# Patient Record
Sex: FEMALE | Race: WHITE | NOT HISPANIC OR LATINO | Employment: OTHER
[De-identification: names, ages, dates, MRNs, and addresses within clinical notes are randomized per-mention and may not be internally consistent; named-entity substitution may affect disease eponyms.]

---

## 2020-06-12 ENCOUNTER — RECORDS - HEALTHEAST (OUTPATIENT)
Dept: ADMINISTRATIVE | Facility: OTHER | Age: 85
End: 2020-06-12

## 2020-06-12 ENCOUNTER — OFFICE VISIT (OUTPATIENT)
Dept: NEUROLOGY | Facility: CLINIC | Age: 85
End: 2020-06-12
Payer: MEDICARE

## 2020-06-12 VITALS — BODY MASS INDEX: 22.15 KG/M2 | HEIGHT: 63 IN | WEIGHT: 125 LBS

## 2020-06-12 DIAGNOSIS — I63.9 ACUTE ISCHEMIC STROKE (H): Primary | ICD-10-CM

## 2020-06-12 DIAGNOSIS — R41.3 MEMORY LOSS: ICD-10-CM

## 2020-06-12 PROBLEM — E11.59 HYPERTENSION ASSOCIATED WITH DIABETES (H): Status: ACTIVE | Noted: 2018-03-23

## 2020-06-12 PROBLEM — E78.5 HYPERLIPIDEMIA DUE TO TYPE 2 DIABETES MELLITUS (H): Status: ACTIVE | Noted: 2017-08-18

## 2020-06-12 PROBLEM — I15.2 HYPERTENSION ASSOCIATED WITH DIABETES (H): Status: ACTIVE | Noted: 2018-03-23

## 2020-06-12 PROBLEM — E11.69 HYPERLIPIDEMIA DUE TO TYPE 2 DIABETES MELLITUS (H): Status: ACTIVE | Noted: 2017-08-18

## 2020-06-12 PROCEDURE — 99205 OFFICE O/P NEW HI 60 MIN: CPT | Mod: 95 | Performed by: PSYCHIATRY & NEUROLOGY

## 2020-06-12 RX ORDER — QUETIAPINE FUMARATE 25 MG/1
25 TABLET, FILM COATED ORAL DAILY
COMMUNITY

## 2020-06-12 RX ORDER — ASPIRIN 81 MG/1
81 TABLET ORAL DAILY
COMMUNITY
End: 2021-04-09

## 2020-06-12 RX ORDER — ACETAMINOPHEN 500 MG
500-1000 TABLET ORAL EVERY 6 HOURS PRN
COMMUNITY

## 2020-06-12 RX ORDER — CLOPIDOGREL BISULFATE 75 MG/1
75 TABLET ORAL DAILY
COMMUNITY
Start: 2018-12-20

## 2020-06-12 RX ORDER — ROSUVASTATIN CALCIUM 20 MG/1
20 TABLET, COATED ORAL DAILY
COMMUNITY

## 2020-06-12 RX ORDER — CARVEDILOL 3.12 MG/1
3.12 TABLET ORAL 2 TIMES DAILY
COMMUNITY
Start: 2019-02-01

## 2020-06-12 ASSESSMENT — MIFFLIN-ST. JEOR: SCORE: 991.13

## 2020-06-12 NOTE — PATIENT INSTRUCTIONS
"  Patient Education   Coping with Memory Loss  What happens when you have memory loss?  Memory loss causes problems with thinking, learning and memory. You may feel forgetful or have trouble focusing on tasks.  Memory loss may be a side effect of medicine, chemotherapy or radiation. In these cases, problems with memory may improve after you finish your treatment. But you could have long-term problems.  Other factors that affect memory and your ability to focus include:    Aging    Illness    Depression    Hormonal changes    Anemia (low red blood cells)    Stress.  What can I do to treat or prevent memory loss?  Problems with thinking and memory can be very frustrating. There is no standard treatment at this time. But there are things you can do to reduce the effects of memory loss:    Really pay attention. Use your eyes, ears and sense of touch to remember things. Focus when someone tells you something.    Limit distractions when you need to complete tasks that require you to focus.    Tell yourself what you are doing as you do it. For example, if you're going out for a few hours, as you close the door tell yourself, \"I'm locking the door now and putting the key in my pocket so I don't have to worry about whether I locked the door.\"    Give yourself clear reminders. If you need to buy dog food, put the empty bag at the door so you'll see it as you leave the house. Or write yourself a note and put it where it's needed.    Keep things in the same place. This way you don't have to wonder where you put your keys, remote control or medicine.    Keep a journal of daily events and activities. Carry a notebook and write down important information that you want to remember.    Exercise your brain. For example, do crossword puzzles, painting, sudoku.    Use word play and rhyming to help remember things.    Use helpful tools, such as:  ? A daily planner  ? A wall calendar  ? Checklists  ? Sticky notes  ? A  near the " door  ? A pre-programmed phone  ? A wristwatch with an alarm  ? A pill box to keep track of your medicines.    Get plenty of sleep and exercise each day.    Stay positive, and try to manage stress.    If you think you may be depressed, talk to your doctor. Depression should be treated.  When should I call my care team?  Call your care team if:    You feel depressed.    You cannot complete basic daily activities.  Comments:  __________________________________________  __________________________________________  __________________________________________  __________________________________________  __________________________________________  __________________________________________  __________________________________________  For informational purposes only. Not to replace the advice of your health care provider. Copyright   2006 [employer]. All rights reserved. Clinically reviewed by [facility]. SMARTworks 936819 - REV 04/19.

## 2020-06-12 NOTE — PROGRESS NOTES
"Patient requested video consultation  Video consultation due to the COVID-19 pandemic  Phone #[phone]  Patient identified      .Patient is being evaluated via a billable video visit. The patient has been notified of following:     \"This video visit will be conducted via a call between you and your physician/provider. We have found that certain health care needs can be provided without the need for an in-person physical exam. This service lets us provide the care you need with a video conversation. If a prescription is necessary we can send it directly to your pharmacy. If lab work is needed we can place an order for that and you can then stop by our lab to have the test done at a later time.    If during the course of the call the physician/provider feels a video visit is not appropriate, you will not be charged for this service.    Physician has received verbal consent for a Video Visit from the patient? YES    Patient would like the video invitation sent by: Text [phone]    [employer]    Video Visit Details    Type of service: Video Visit    Video Start Time: 10:30 am    Video End Time (time video stopped):  11:30 am    Originating Location (pt. Location): Patient's Home    Distant Location (provider location): [facility] [place]     Mode of Communication: Video Conference via [employer]  /  [employer]        Chief complaint  Short-term memory loss  Stroke September 2019 with right gaze preference          HPI    84-year-old who back in September 2019 had a acute stroke.  Patient presented with a right gaze preference    Patient has had difficulty with mass short-term mostly  Son states that patient had difficulty after her eye that was back in 2011    Patient has a sister who developed maybe Alzheimer's disease in her 70s    Patient then had a \"stroke or event in summer 2019 and seemed to have further memory loss    Patient currently lives with HER-2 sons    Current situation  Lives " with treatment  There are stairs there to level she does those okay  Eats okay  Actives of daily living does her own dressing and bathing  Other prescription and others do the checkbook  She denies any hallucinations  She has some insomnia and is on some Seroquel for that      No recent falls no injuries    No recent hospitalizations or major illnesses          Past medical history  Stroke 2019  Coronary artery disease  Hypertension hyperlipidemia  Congestive heart failure  Diabetes  Kidney disease  Cholecystectomy  Anemia  Irritable bowel syndrome        Habits  Non-smoker  Does not drink alcohol      Family history  Sister that may have had Alzheimer's developed in her 70s she is passed away  Mother with lung cancer  Brother with kidney disease  at age 78  Brother with cancer  Brother with heart disease  in his 50s          Work-up  2019, EEG no epileptiform activity  2019 MRI scan no obvious acute stroke or abnormal enhancement motion degraded              Exam    Review of systems pertinent positives and negatives  No headache no chest pain no shortness of breath no nausea vomiting no diarrhea no fever chills no sore throat no cough no rashes    No clear ulcer seizures    No diplopia dysarthria dysphasia  No weakness  Does have arthritis in her right hand has some slow gait    Otherwise review of systems negative    General exam  Alert and attentive  HEENT normal  Breathing okay regular  No edema in the feet  Belly left    Arthritic changes in the hands right greater than left  Decreased range of motion of the shoulders    Neurologic exam  Alert and attentive  Normal naming  Normal repetition  Normal prosody of speech  Follows a two-part command  No neglect    Short-term memory recall decreased  Year is   Does not know the president  Had trouble coming up with the month he got   Got the day wrong      Cranials 2 through 12 normal  No ophthalmoplegia  No visual field  cut  Tongue twisters are good  No nystagmus  Face is    Upper extremities  No drift  No tremor  Finger-nose okay  Because of the shoulders and some arthritic changes in her hands    Lower extremities  Slight bradykinesia  No focal weakness    Gait  Gets up from the rocking chair pushing off with her hands    Can ambulate independently but is somewhat cautious    Turns in the [place] without getting dizzy      Impression    1.  History of memory falloff going on since 2011 after her cardiac illness.  Seen after a event/stroke in September 2019  Family history of sister with dementia all type    2.  Vascular his include hypertension hyperlipidemia diabetes cardiac disease kidney disease    Globin between the high 200s for the highs but never very low        Following discussion with patient and son about her disease next  Talked about routine is helpful    Started on syrup sleep to help modulate her sleep in the last month or 2    Seems to happy with recall and short-term memory but no hallucinations or severe behavioral problems    Recommend    Check EEG pending    MRI scan brain mild atrophy    B12 727  TSH 1.70      Follow-up after the above    Hold off on starting Aricept at this time    May have more of a vascular dementia but does have a sister with demented disease    Outside records and charts reviewed, minutes total care time today with greater than 50% face-to-face discussion about dementia costs and the different factors that can be involved.

## 2020-06-12 NOTE — NURSING NOTE
Chief Complaint   Patient presents with     Consult For     Memory loss-especially short term. Son Freedom who is also POA states that patient had a stroke and was seen in [place] around 9/24/2019 and has declined further since then (records pulled in care everywhere to view)      Video [phone]  doximyoandy Ngo on 6/12/2020 at 10:22 AM

## 2020-06-12 NOTE — LETTER
"    6/12/2020         RE: Riddhi Liu  [address] [address]        Dear Colleague,    Thank you for referring your patient, Riddhi Liu, to the [facility] [place]. Please see a copy of my visit note below.    Patient requested video consultation  Video consultation due to the COVID-19 pandemic  Phone #[phone]  Patient identified      .Patient is being evaluated via a billable video visit. The patient has been notified of following:     \"This video visit will be conducted via a call between you and your physician/provider. We have found that certain health care needs can be provided without the need for an in-person physical exam. This service lets us provide the care you need with a video conversation. If a prescription is necessary we can send it directly to your pharmacy. If lab work is needed we can place an order for that and you can then stop by our lab to have the test done at a later time.    If during the course of the call the physician/provider feels a video visit is not appropriate, you will not be charged for this service.    Physician has received verbal consent for a Video Visit from the patient? YES    Patient would like the video invitation sent by: Text [phone]    [employer]istic    Video Visit Details    Type of service: Video Visit    Video Start Time: 10:30 am    Video End Time (time video stopped):  11:30 am    Originating Location (pt. Location): Patient's Home    Distant Location (provider location): [facility] [place]     Mode of Communication: Video Conference via [employer]  /  Footbalistic        Chief complaint  Short-term memory loss  Stroke September 2019 with right gaze preference          HPI    84-year-old who back in September 2019 had a acute stroke.  Patient presented with a right gaze preference    Patient has had difficulty with mass short-term mostly  Son states that patient had difficulty after her eye that was back in " "    Patient has a sister who developed maybe Alzheimer's disease in her 70s    Patient then had a \"stroke or event in summer 2019 and seemed to have further memory loss    Patient currently lives with HER-2 sons    Current situation  Lives with treatment  There are stairs there to level she does those okay  Eats okay  Actives of daily living does her own dressing and bathing  Other prescription and others do the checkbook  She denies any hallucinations  She has some insomnia and is on some Seroquel for that      No recent falls no injuries    No recent hospitalizations or major illnesses          Past medical history  Stroke 2019  Coronary artery disease  Hypertension hyperlipidemia  Congestive heart failure  Diabetes  Kidney disease  Cholecystectomy  Anemia  Irritable bowel syndrome        Habits  Non-smoker  Does not drink alcohol      Family history  Sister that may have had Alzheimer's developed in her 70s she is passed away  Mother with lung cancer  Brother with kidney disease  at age 78  Brother with cancer  Brother with heart disease  in his 50s          Work-up  2019, EEG no epileptiform activity  2019 MRI scan no obvious acute stroke or abnormal enhancement motion degraded              Exam    Review of systems pertinent positives and negatives  No headache no chest pain no shortness of breath no nausea vomiting no diarrhea no fever chills no sore throat no cough no rashes    No clear ulcer seizures    No diplopia dysarthria dysphasia  No weakness  Does have arthritis in her right hand has some slow gait    Otherwise review of systems negative    General exam  Alert and attentive  HEENT normal  Breathing okay regular  No edema in the feet  Belly left    Arthritic changes in the hands right greater than left  Decreased range of motion of the shoulders    Neurologic exam  Alert and attentive  Normal naming  Normal repetition  Normal prosody of speech  Follows a " two-part command  No neglect    Short-term memory recall decreased  Year is 2028  Does not know the president  Had trouble coming up with the month he got June  Got the day wrong      Cranials 2 through 12 normal  No ophthalmoplegia  No visual field cut  Tongue twisters are good  No nystagmus  Face is    Upper extremities  No drift  No tremor  Finger-nose okay  Because of the shoulders and some arthritic changes in her hands    Lower extremities  Slight bradykinesia  No focal weakness    Gait  Gets up from the rocking chair pushing off with her hands    Can ambulate independently but is somewhat cautious    Turns in the [place] without getting dizzy      Impression    1.  History of memory falloff going on since 2011 after her cardiac illness.  Seen after a event/stroke in September 2019  Family history of sister with dementia all type    2.  Vascular his include hypertension hyperlipidemia diabetes cardiac disease kidney disease    Globin between the high 200s for the highs but never very low        Following discussion with patient and son about her disease next  Talked about routine is helpful    Started on syrup sleep to help modulate her sleep in the last month or 2    Seems to happy with recall and short-term memory but no hallucinations or severe behavioral problems    Recommend    Check EEG  Check MRI scan of the head  Check B12 and TSH    Follow-up after the above    Hold off on starting Aricept at this time    May have more of a vascular dementia but does have a sister with demented disease    Outside records and charts reviewed, minutes total care time today with greater than 50% face-to-face discussion about dementia costs and the different factors that can be involved.          Again, thank you for allowing me to participate in the care of your patient.        Sincerely,        Huey Bullock MD

## 2020-06-19 ENCOUNTER — RECORDS - HEALTHEAST (OUTPATIENT)
Dept: LAB | Facility: HOSPITAL | Age: 85
End: 2020-06-19

## 2020-06-19 LAB
TSH SERPL DL<=0.005 MIU/L-ACNC: 1.7 UIU/ML (ref 0.3–5)
VIT B12 SERPL-MCNC: 727 PG/ML (ref 213–816)

## 2020-06-24 ENCOUNTER — HOSPITAL ENCOUNTER (OUTPATIENT)
Dept: MRI IMAGING | Facility: CLINIC | Age: 85
Discharge: HOME | End: 2020-06-24
Attending: PSYCHIATRY & NEUROLOGY

## 2020-06-24 DIAGNOSIS — R41.3 MEMORY LOSS: ICD-10-CM

## 2020-06-24 DIAGNOSIS — I63.9 ACUTE ISCHEMIC STROKE (H): ICD-10-CM

## 2020-07-13 ENCOUNTER — OFFICE VISIT (OUTPATIENT)
Dept: NEUROLOGY | Facility: CLINIC | Age: 85
End: 2020-07-13
Payer: MEDICARE

## 2020-07-13 ENCOUNTER — ANCILLARY PROCEDURE (OUTPATIENT)
Dept: NEUROLOGY | Facility: CLINIC | Age: 85
End: 2020-07-13
Attending: PSYCHIATRY & NEUROLOGY
Payer: MEDICARE

## 2020-07-13 VITALS — BODY MASS INDEX: 22.15 KG/M2 | WEIGHT: 125 LBS | HEIGHT: 63 IN

## 2020-07-13 DIAGNOSIS — I63.9 ACUTE ISCHEMIC STROKE (H): Primary | ICD-10-CM

## 2020-07-13 DIAGNOSIS — R41.3 MEMORY LOSS: ICD-10-CM

## 2020-07-13 PROCEDURE — 99214 OFFICE O/P EST MOD 30 MIN: CPT | Mod: 95 | Performed by: PSYCHIATRY & NEUROLOGY

## 2020-07-13 PROCEDURE — 95816 EEG AWAKE AND DROWSY: CPT | Performed by: PSYCHIATRY & NEUROLOGY

## 2020-07-13 RX ORDER — DONEPEZIL HYDROCHLORIDE 5 MG/1
5 TABLET, FILM COATED ORAL AT BEDTIME
Qty: 30 TABLET | Refills: 11 | Status: SHIPPED | OUTPATIENT
Start: 2020-07-13 | End: 2021-06-08

## 2020-07-13 ASSESSMENT — MIFFLIN-ST. JEOR: SCORE: 991.13

## 2020-07-13 NOTE — PATIENT INSTRUCTIONS
"  Patient Education   Coping with Memory Loss  What happens when you have memory loss?  Memory loss causes problems with thinking, learning and memory. You may feel forgetful or have trouble focusing on tasks.  Memory loss may be a side effect of medicine, chemotherapy or radiation. In these cases, problems with memory may improve after you finish your treatment. But you could have long-term problems.  Other factors that affect memory and your ability to focus include:    Aging    Illness    Depression    Hormonal changes    Anemia (low red blood cells)    Stress.  What can I do to treat or prevent memory loss?  Problems with thinking and memory can be very frustrating. There is no standard treatment at this time. But there are things you can do to reduce the effects of memory loss:    Really pay attention. Use your eyes, ears and sense of touch to remember things. Focus when someone tells you something.    Limit distractions when you need to complete tasks that require you to focus.    Tell yourself what you are doing as you do it. For example, if you're going out for a few hours, as you close the door tell yourself, \"I'm locking the door now and putting the key in my pocket so I don't have to worry about whether I locked the door.\"    Give yourself clear reminders. If you need to buy dog food, put the empty bag at the door so you'll see it as you leave the house. Or write yourself a note and put it where it's needed.    Keep things in the same place. This way you don't have to wonder where you put your keys, remote control or medicine.    Keep a journal of daily events and activities. Carry a notebook and write down important information that you want to remember.    Exercise your brain. For example, do crossword puzzles, painting, sudoku.    Use word play and rhyming to help remember things.    Use helpful tools, such as:  ? A daily planner  ? A wall calendar  ? Checklists  ? Sticky notes  ? A  near the " door  ? A pre-programmed phone  ? A wristwatch with an alarm  ? A pill box to keep track of your medicines.    Get plenty of sleep and exercise each day.    Stay positive, and try to manage stress.    If you think you may be depressed, talk to your doctor. Depression should be treated.  When should I call my care team?  Call your care team if:    You feel depressed.    You cannot complete basic daily activities.  Comments:  __________________________________________  __________________________________________  __________________________________________  __________________________________________  __________________________________________  __________________________________________  __________________________________________  For informational purposes only. Not to replace the advice of your health care provider. Copyright   2006 [employer]. All rights reserved. Clinically reviewed by [facility]. SMARTworks 150011 - REV 04/19.

## 2020-07-13 NOTE — PROGRESS NOTES
"Patient requested video follow-up note  Video consultation due to the COVID-19 pandemic  Phone #[phone]  Patient identified      .Patient is being evaluated via a billable video visit. The patient has been notified of following:     \"This video visit will be conducted via a call between you and your physician/provider. We have found that certain health care needs can be provided without the need for an in-person physical exam. This service lets us provide the care you need with a video conversation. If a prescription is necessary we can send it directly to your pharmacy. If lab work is needed we can place an order for that and you can then stop by our lab to have the test done at a later time.    If during the course of the call the physician/provider feels a video visit is not appropriate, you will not be charged for this service.    Physician has received verbal consent for a Video Visit from the patient? YES    Patient would like the video invitation sent by: Text [phone]    [facility]    Video Visit Details    Type of service: Video Visit    Video Start Time: 12:15 PM    Video End Time (time video stopped): 12:45 PM    Originating Location (pt. Location): Patient's Home    Distant Location (provider location): [facility] [place]     Mode of Communication: Video Conference via [employer] [employer]  /  [facility]        Chief complaint  Short-term memory loss  Stroke September 2019 with right gaze preference          HPI    84-year-old who back in September 2019 had a acute stroke.  Patient presented with a right gaze preference    Patient has had difficulty with mass short-term mostly  Son states that patient had difficulty after her eye that was back in 2011    Patient has a sister who developed maybe Alzheimer's disease in her 70s    Patient then had a \"stroke or event in summer 2019 and seemed to have further memory loss    Patient currently lives with HER-2 sons    Current situation  Lives " with family  There are stairs there to level she does those okay  Eats okay  Actives of daily living does her own dressing and bathing  Other prescription and others do the checkbook  She denies any hallucinations  She has some insomnia and is on some Seroquel for that      No recent falls no injuries    No recent hospitalizations or major illnesses          Past medical history  Stroke 2019  Coronary artery disease  Hypertension hyperlipidemia  Congestive heart failure  Diabetes  Kidney disease  Cholecystectomy  Anemia  Irritable bowel syndrome        Habits  Non-smoker  Does not drink alcohol      Family history  Sister that may have had Alzheimer's developed in her 70s she is passed away  Mother with lung cancer  Brother with kidney disease  at age 78  Brother with cancer  Brother with heart disease  in his 50s          Work-up  2019, EEG no epileptiform activity  2019 MRI scan no obvious acute stroke or abnormal enhancement motion degraded              Exam    Review of systems pertinent positives and negatives  No headache no chest pain no shortness of breath no nausea vomiting no diarrhea no fever chills no sore throat no cough no rashes    No clear ulcer seizures    No diplopia dysarthria dysphasia  No weakness  Does have arthritis in her right hand has some slow gait    Otherwise review of systems negative    General exam  Alert and attentive  HEENT normal  Breathing okay regular  No edema in the feet  Belly left    Arthritic changes in the hands right greater than left  Decreased range of motion of the shoulders    Neurologic exam  Alert and attentive  Normal naming  Normal repetition  Normal prosody of speech  Follows a two-part command  No neglect    Short-term memory recall decreased  Year is   Does not know the president  Had trouble coming up with the month he got   Got the day wrong      Cranials 2 through 12 normal  No ophthalmoplegia  No visual field  cut  Tongue twisters are good  No nystagmus  Face is    Upper extremities  No drift  No tremor  Finger-nose okay  Because of the shoulders and some arthritic changes in her hands    Lower extremities  Slight bradykinesia  No focal weakness    Gait  Gets up from the rocking chair pushing off with her hands    Can ambulate independently but is somewhat cautious    Turns in the [place] without getting dizzy      Impression    1.  History of memory falloff going on since 2011 after her cardiac illness.  Seen after a event/stroke in September 2019  Family history of sister with dementia all type  Repeats herself a lot but long-term history is pretty good      2.  Vascular his include hypertension hyperlipidemia diabetes cardiac disease kidney disease      3.  Glucoses sometimes high but never low        Following discussion with patient and son about her disease     Talked about routine is helpful    Started on melatonin  to help modulate her sleep in the last month or 2    Seems to have trouble with recall and short-term memory but no hallucinations or severe behavioral problems    Recommend    Check EEG 8 Hz posterior dominant rhythm with a little bit of theta disorganization 6-7 Hz    MRI scan brain mild atrophy    B12 727  TSH 1.70      Long discussion with patient's family about possibility of 2 different components both vascular and hereditary dementia    May have more of a vascular dementia but does have a sister with demented disease      Does have some irritable bowel but usually not a problem    Discussed starting Aricept low-dose with a follow-up in 3 months      Aricept 5 mg once at nighttime  Follow-up in 3 months    25 minutes total care time today greater than for percent face-to-face discussion and counseling about dementias different types medication side effects and benefits

## 2020-07-13 NOTE — NURSING NOTE
Chief Complaint   Patient presents with     Follow Up     Result follow up for MRI/Labs/EEG      Saadia Lindsey ([employer] phone) [phone]  Mohsen Ngo on 7/13/2020 at 11:16 AM

## 2020-07-13 NOTE — LETTER
"    7/13/2020         RE: Riddhi Liu  [address] [facility] [address]        Dear Colleague,    Thank you for referring your patient, Riddhi Liu, to the [facility] [place]. Please see a copy of my visit note below.    Patient requested video follow-up note  Video consultation due to the COVID-19 pandemic  Phone #[phone]  Patient identified      .Patient is being evaluated via a billable video visit. The patient has been notified of following:     \"This video visit will be conducted via a call between you and your physician/provider. We have found that certain health care needs can be provided without the need for an in-person physical exam. This service lets us provide the care you need with a video conversation. If a prescription is necessary we can send it directly to your pharmacy. If lab work is needed we can place an order for that and you can then stop by our lab to have the test done at a later time.    If during the course of the call the physician/provider feels a video visit is not appropriate, you will not be charged for this service.    Physician has received verbal consent for a Video Visit from the patient? YES    Patient would like the video invitation sent by: Text [phone]    [employer]*    Video Visit Details    Type of service: Video Visit    Video Start Time: 12:15 PM    Video End Time (time video stopped): 12:45 PM    Originating Location (pt. Location): Patient's Home    Distant Location (provider location): [facility] [place]     Mode of Communication: Video Conference via [employer]  /  [employer]*        Chief complaint  Short-term memory loss  Stroke September 2019 with right gaze preference          HPI    84-year-old who back in September 2019 had a acute stroke.  Patient presented with a right gaze preference    Patient has had difficulty with mass short-term mostly  Son states that patient had difficulty after her eye that was back in " "    Patient has a sister who developed maybe Alzheimer's disease in her 70s    Patient then had a \"stroke or event in summer 2019 and seemed to have further memory loss    Patient currently lives with HER-2 sons    Current situation  Lives with family  There are stairs there to level she does those okay  Eats okay  Actives of daily living does her own dressing and bathing  Other prescription and others do the checkbook  She denies any hallucinations  She has some insomnia and is on some Seroquel for that      No recent falls no injuries    No recent hospitalizations or major illnesses          Past medical history  Stroke 2019  Coronary artery disease  Hypertension hyperlipidemia  Congestive heart failure  Diabetes  Kidney disease  Cholecystectomy  Anemia  Irritable bowel syndrome        Habits  Non-smoker  Does not drink alcohol      Family history  Sister that may have had Alzheimer's developed in her 70s she is passed away  Mother with lung cancer  Brother with kidney disease  at age 78  Brother with cancer  Brother with heart disease  in his 50s          Work-up  2019, EEG no epileptiform activity  2019 MRI scan no obvious acute stroke or abnormal enhancement motion degraded              Exam    Review of systems pertinent positives and negatives  No headache no chest pain no shortness of breath no nausea vomiting no diarrhea no fever chills no sore throat no cough no rashes    No clear ulcer seizures    No diplopia dysarthria dysphasia  No weakness  Does have arthritis in her right hand has some slow gait    Otherwise review of systems negative    General exam  Alert and attentive  HEENT normal  Breathing okay regular  No edema in the feet  Belly left    Arthritic changes in the hands right greater than left  Decreased range of motion of the shoulders    Neurologic exam  Alert and attentive  Normal naming  Normal repetition  Normal prosody of speech  Follows a two-part " command  No neglect    Short-term memory recall decreased  Year is 2028  Does not know the president  Had trouble coming up with the month he got June  Got the day wrong      Cranials 2 through 12 normal  No ophthalmoplegia  No visual field cut  Tongue twisters are good  No nystagmus  Face is    Upper extremities  No drift  No tremor  Finger-nose okay  Because of the shoulders and some arthritic changes in her hands    Lower extremities  Slight bradykinesia  No focal weakness    Gait  Gets up from the rocking chair pushing off with her hands    Can ambulate independently but is somewhat cautious    Turns in the [place] without getting dizzy      Impression    1.  History of memory falloff going on since 2011 after her cardiac illness.  Seen after a event/stroke in September 2019  Family history of sister with dementia all type  Repeats herself a lot but long-term history is pretty good      2.  Vascular his include hypertension hyperlipidemia diabetes cardiac disease kidney disease      3.  Glucoses sometimes high but never low        Following discussion with patient and son about her disease     Talked about routine is helpful    Started on melatonin  to help modulate her sleep in the last month or 2    Seems to have trouble with recall and short-term memory but no hallucinations or severe behavioral problems    Recommend    Check EEG 8 Hz posterior dominant rhythm with a little bit of theta disorganization 6-7 Hz    MRI scan brain mild atrophy    B12 727  TSH 1.70      Long discussion with patient's family about possibility of 2 different components both vascular and hereditary dementia    May have more of a vascular dementia but does have a sister with demented disease      Does have some irritable bowel but usually not a problem    Discussed starting Aricept low-dose with a follow-up in 3 months      Aricept 5 mg once at nighttime  Follow-up in 3 months    25 minutes total care time today greater than for  percent face-to-face discussion and counseling about dementias different types medication side effects and benefits          Again, thank you for allowing me to participate in the care of your patient.        Sincerely,        Huey Bullock MD

## 2020-07-23 ENCOUNTER — AMBULATORY - HEALTHEAST (OUTPATIENT)
Dept: OTHER | Facility: CLINIC | Age: 85
End: 2020-07-23

## 2020-07-23 ENCOUNTER — DOCUMENTATION ONLY (OUTPATIENT)
Dept: OTHER | Facility: CLINIC | Age: 85
End: 2020-07-23

## 2020-09-21 ENCOUNTER — VIRTUAL VISIT (OUTPATIENT)
Dept: NEUROLOGY | Facility: CLINIC | Age: 85
End: 2020-09-21
Payer: MEDICARE

## 2020-09-21 ENCOUNTER — TELEPHONE (OUTPATIENT)
Dept: NEUROLOGY | Facility: CLINIC | Age: 85
End: 2020-09-21

## 2020-09-21 VITALS — WEIGHT: 125 LBS | HEIGHT: 63 IN | BODY MASS INDEX: 22.15 KG/M2

## 2020-09-21 DIAGNOSIS — I63.9 ACUTE ISCHEMIC STROKE (H): Primary | ICD-10-CM

## 2020-09-21 DIAGNOSIS — R41.3 MEMORY LOSS: ICD-10-CM

## 2020-09-21 PROCEDURE — 99214 OFFICE O/P EST MOD 30 MIN: CPT | Mod: 95 | Performed by: PSYCHIATRY & NEUROLOGY

## 2020-09-21 ASSESSMENT — MIFFLIN-ST. JEOR: SCORE: 986.13

## 2020-09-21 NOTE — LETTER
September 21, 2020      Riddhi Liu  [address] [facility] [address]        Dear ,    The above patient has had a previous history of stroke and history of memory loss.  Patient has a increased risk for falls on uneven surfaces, slippery surfaces, or walking significant distance.    Due to her medical illness and increased risks for falls.    Recommend  Light weight wheelchair collapsible self-propelled for transport when outside the home to avoid falls.    Sincerely,        Huey Bullock MD

## 2020-09-21 NOTE — TELEPHONE ENCOUNTER
Caller left msg stating his mother has an appt today and missed a call. Caller asked for a return call at [phone].

## 2020-09-21 NOTE — PATIENT INSTRUCTIONS
"  Patient Education   Coping with Memory Loss  What happens when you have memory loss?  Memory loss causes problems with thinking, learning and memory. You may feel forgetful or have trouble focusing on tasks.  Memory loss may be a side effect of medicine, chemotherapy or radiation. In these cases, problems with memory may improve after you finish your treatment. But you could have long-term problems.  Other factors that affect memory and your ability to focus include:    Aging    Illness    Depression    Hormonal changes    Anemia (low red blood cells)    Stress.  What can I do to treat or prevent memory loss?  Problems with thinking and memory can be very frustrating. There is no standard treatment at this time. But there are things you can do to reduce the effects of memory loss:    Really pay attention. Use your eyes, ears and sense of touch to remember things. Focus when someone tells you something.    Limit distractions when you need to complete tasks that require you to focus.    Tell yourself what you are doing as you do it. For example, if you're going out for a few hours, as you close the door tell yourself, \"I'm locking the door now and putting the key in my pocket so I don't have to worry about whether I locked the door.\"    Give yourself clear reminders. If you need to buy dog food, put the empty bag at the door so you'll see it as you leave the house. Or write yourself a note and put it where it's needed.    Keep things in the same place. This way you don't have to wonder where you put your keys, remote control or medicine.    Keep a journal of daily events and activities. Carry a notebook and write down important information that you want to remember.    Exercise your brain. For example, do crossword puzzles, painting, sudoku.    Use word play and rhyming to help remember things.    Use helpful tools, such as:  ? A daily planner  ? A wall calendar  ? Checklists  ? Sticky notes  ? A  near the " door  ? A pre-programmed phone  ? A wristwatch with an alarm  ? A pill box to keep track of your medicines.    Get plenty of sleep and exercise each day.    Stay positive, and try to manage stress.    If you think you may be depressed, talk to your doctor. Depression should be treated.  When should I call my care team?  Call your care team if:    You feel depressed.    You cannot complete basic daily activities.  Comments:  __________________________________________  __________________________________________  __________________________________________  __________________________________________  __________________________________________  __________________________________________  __________________________________________  For informational purposes only. Not to replace the advice of your health care provider. Copyright   2006 [employer]. All rights reserved. Clinically reviewed by [facility]. [employer] 485176 - REV 04/19.

## 2020-09-21 NOTE — PROGRESS NOTES
"Patient requested video follow-up note  Video consultation due to the COVID-19 pandemic  Phone #[phone]  Patient identified      .Patient is being evaluated via a billable video visit. The patient has been notified of following:     \"This video visit will be conducted via a call between you and your physician/provider. We have found that certain health care needs can be provided without the need for an in-person physical exam. This service lets us provide the care you need with a video conversation. If a prescription is necessary we can send it directly to your pharmacy. If lab work is needed we can place an order for that and you can then stop by our lab to have the test done at a later time.    If during the course of the call the physician/provider feels a video visit is not appropriate, you will not be charged for this service.    Physician has received verbal consent for a Video Visit from the patient? YES    Patient would like the video invitation sent by: Text [phone]    [employer]    Video Visit Details    Type of service: Video Visit    Video Start Time: 11 AM    Video End Time (time video stopped): 11:25 AM    Originating Location (pt. Location): Patient's Home    Distant Location (provider location): [facility] [place]     Mode of Communication: Video Conference via [employer]  /  [employer]        Chief complaint  Short-term memory loss  Stroke September 2019 with right gaze preference          HPI    84-year-old who back in September 2019 had a acute stroke.  Patient presented with a right gaze preference    Patient has had difficulty with mass short-term mostly  Son states that patient had difficulty after her eye that was back in 2011    Patient has a sister who developed maybe Alzheimer's disease in her 70s    Patient then had a \"stroke or event in summer 2019 and seemed to have further memory loss    Patient currently lives with Her 2 sons    Current situation  Lives with " family  There are stairs there to level she does those okay  Eats okay  Actives of daily living does her own dressing and bathing  Other prescription and others do the checkbook  She denies any hallucinations  She has some insomnia and is on some Seroquel for that      No recent falls no injuries    No recent hospitalizations or major illnesses          Past medical history  Stroke 2019  Coronary artery disease  Hypertension hyperlipidemia  Congestive heart failure  Diabetes  Kidney disease  Cholecystectomy  Anemia  Irritable bowel syndrome        Habits  Non-smoker  Does not drink alcohol      Family history  Sister that may have had Alzheimer's developed in her 70s she is passed away  Mother with lung cancer  Brother with kidney disease  at age 78  Brother with cancer  Brother with heart disease  in his 50s          Work-up  2019, EEG no epileptiform activity  2019 MRI scan no obvious acute stroke or abnormal enhancement motion degraded  MRI scan brain 2020 mild atrophy  B12 727  TSH 1.70  EEG 8 Hz burst dominant rhythm with a little bit of theta disorganization 6-7 Hz              Exam    Review of past exam    Review of systems pertinent positives and negatives  No headache no chest pain no shortness of breath no nausea vomiting no diarrhea no fever chills no sore throat no cough no rashes    No clear ulcer seizures    No diplopia dysarthria dysphasia  No weakness  Does have arthritis in her right hand has some slow gait    Otherwise review of systems negative    General exam  Alert and attentive  HEENT normal  Breathing okay regular  No edema in the feet  Belly left    Arthritic changes in the hands right greater than left  Decreased range of motion of the shoulders    Neurologic exam  Alert and attentive  Normal naming  Normal repetition  Normal prosody of speech  Follows a two-part command  No neglect    Short-term memory recall decreased  Year 2028  Does not know  the president  Had trouble coming up with the month he got June Got the day wrong      Cranials 2 through 12 normal  No ophthalmoplegia  No visual field cut  Tongue twisters are good  No nystagmus  Face is    Upper extremities  No drift  No tremor  Finger-nose okay  Because of the shoulders and some arthritic changes in her hands    Lower extremities  Slight bradykinesia  No focal weakness    Gait  Gets up from the rocking chair pushing off with her hands    Can ambulate independently but is somewhat cautious concern for balance on uneven surfaces or slippery surfaces  May have difficulty if she has to walk too far    Turns in the [place] without getting dizzy      Impression    1.  History of memory falloff going on since 2011 after her cardiac illness.  Seen after a event/stroke in September 2019  Family history of sister with dementia all type  Repeats herself a lot but long-term history is pretty good      2.  Vascular his include hypertension hyperlipidemia diabetes cardiac disease kidney disease      3.  Glucoses sometimes high but never low        Following discussion with patient and son about her disease     Talked about routine is helpful    Started on melatonin  to help modulate her sleep in the last month or 2    Seems to have trouble with recall and short-term memory but no hallucinations or severe behavioral problems    Talked about how routine is helpful but also may be reminiscing about past family members and pictures that she can review as he gets her some confidence      Recommend          Long discussion with patient's family about possibility of 2 different components both vascular and hereditary dementia    May have more of a vascular dementia but does have a sister with demented disease      Does have some irritable bowel but usually not a problem  Tolerating the Aricept at 5 mg once per day  Prefer not to go up higher    Discussed gait safety though and at risk of falls    Due to increased  risk for falls recommend self propel lightweight collapsible wheelchair to prevent falls when patient is taken to medical appointments the store or out of the house.      Aricept 5 mg once at nighttime  6 months follow-up    25 minutes total care time today greater than 50% face-to-face discussion with patient's caregivers about dementia progression symptoms and signs ways to adapt caregiving safety issues specially risk of falls as we come into winter.

## 2020-09-21 NOTE — LETTER
"    9/21/2020         RE: Riddhi Liu  [address] [facility] [address]        Dear Colleague,    Thank you for referring your patient, Riddhi Liu, to the [facility] [place]. Please see a copy of my visit note below.    Patient requested video follow-up note  Video consultation due to the COVID-19 pandemic  Phone #[phone]  Patient identified      .Patient is being evaluated via a billable video visit. The patient has been notified of following:     \"This video visit will be conducted via a call between you and your physician/provider. We have found that certain health care needs can be provided without the need for an in-person physical exam. This service lets us provide the care you need with a video conversation. If a prescription is necessary we can send it directly to your pharmacy. If lab work is needed we can place an order for that and you can then stop by our lab to have the test done at a later time.    If during the course of the call the physician/provider feels a video visit is not appropriate, you will not be charged for this service.    Physician has received verbal consent for a Video Visit from the patient? YES    Patient would like the video invitation sent by: Text [phone]    [employer]    Video Visit Details    Type of service: Video Visit    Video Start Time: 11 AM    Video End Time (time video stopped): 11:25 AM    Originating Location (pt. Location): Patient's Home    Distant Location (provider location): [facility] [place]     Mode of Communication: Video Conference via [employer]  /  [employer]        Chief complaint  Short-term memory loss  Stroke September 2019 with right gaze preference          HPI    84-year-old who back in September 2019 had a acute stroke.  Patient presented with a right gaze preference    Patient has had difficulty with mass short-term mostly  Son states that patient had difficulty after her eye that was back in " "    Patient has a sister who developed maybe Alzheimer's disease in her 70s    Patient then had a \"stroke or event in summer 2019 and seemed to have further memory loss    Patient currently lives with Her 2 sons    Current situation  Lives with family  There are stairs there to level she does those okay  Eats okay  Actives of daily living does her own dressing and bathing  Other prescription and others do the checkbook  She denies any hallucinations  She has some insomnia and is on some Seroquel for that      No recent falls no injuries    No recent hospitalizations or major illnesses          Past medical history  Stroke 2019  Coronary artery disease  Hypertension hyperlipidemia  Congestive heart failure  Diabetes  Kidney disease  Cholecystectomy  Anemia  Irritable bowel syndrome        Habits  Non-smoker  Does not drink alcohol      Family history  Sister that may have had Alzheimer's developed in her 70s she is passed away  Mother with lung cancer  Brother with kidney disease  at age 78  Brother with cancer  Brother with heart disease  in his 50s          Work-up  2019, EEG no epileptiform activity  2019 MRI scan no obvious acute stroke or abnormal enhancement motion degraded  MRI scan brain 2020 mild atrophy  B12 727  TSH 1.70  EEG 8 Hz burst dominant rhythm with a little bit of theta disorganization 6-7 Hz              Exam    Review of past exam    Review of systems pertinent positives and negatives  No headache no chest pain no shortness of breath no nausea vomiting no diarrhea no fever chills no sore throat no cough no rashes    No clear ulcer seizures    No diplopia dysarthria dysphasia  No weakness  Does have arthritis in her right hand has some slow gait    Otherwise review of systems negative    General exam  Alert and attentive  HEENT normal  Breathing okay regular  No edema in the feet  Belly left    Arthritic changes in the hands right greater than " left  Decreased range of motion of the shoulders    Neurologic exam  Alert and attentive  Normal naming  Normal repetition  Normal prosody of speech  Follows a two-part command  No neglect    Short-term memory recall decreased  Year is 2028  Does not know the president  Had trouble coming up with the month he got June  Got the day wrong      Cranials 2 through 12 normal  No ophthalmoplegia  No visual field cut  Tongue twisters are good  No nystagmus  Face is    Upper extremities  No drift  No tremor  Finger-nose okay  Because of the shoulders and some arthritic changes in her hands    Lower extremities  Slight bradykinesia  No focal weakness    Gait  Gets up from the rocking chair pushing off with her hands    Can ambulate independently but is somewhat cautious concern for balance on uneven surfaces or slippery surfaces  May have difficulty if she has to walk too far    Turns in the [place] without getting dizzy      Impression    1.  History of memory falloff going on since 2011 after her cardiac illness.  Seen after a event/stroke in September 2019  Family history of sister with dementia all type  Repeats herself a lot but long-term history is pretty good      2.  Vascular his include hypertension hyperlipidemia diabetes cardiac disease kidney disease      3.  Glucoses sometimes high but never low        Following discussion with patient and son about her disease     Talked about routine is helpful    Started on melatonin  to help modulate her sleep in the last month or 2    Seems to have trouble with recall and short-term memory but no hallucinations or severe behavioral problems    Talked about how routine is helpful but also may be reminiscing about past family members and pictures that she can review as he gets her some confidence      Recommend          Long discussion with patient's family about possibility of 2 different components both vascular and hereditary dementia    May have more of a vascular dementia  but does have a sister with demented disease      Does have some irritable bowel but usually not a problem  Tolerating the Aricept at 5 mg once per day  Prefer not to go up higher    Discussed gait safety though and at risk of falls    Due to increased risk for falls recommend self propel lightweight collapsible wheelchair to prevent falls when patient is taken to medical appointments the store or out of the house.      Aricept 5 mg once at nighttime  6 months follow-up    25 minutes total care time today greater than 50% face-to-face discussion with patient's caregivers about dementia progression symptoms and signs ways to adapt caregiving safety issues specially risk of falls as we come into winter.          Again, thank you for allowing me to participate in the care of your patient.        Sincerely,        Huey Bullock MD

## 2020-10-15 ENCOUNTER — TELEPHONE (OUTPATIENT)
Dept: NEUROLOGY | Facility: CLINIC | Age: 85
End: 2020-10-15

## 2020-10-15 NOTE — TELEPHONE ENCOUNTER
Radha with [facility] [employer] left msg requesting a call back regarding changes to the wheelchair order for billing/insurance purposes. [phone]

## 2020-10-15 NOTE — LETTER
October 23, 2020      Riddhi Liu  [address] [facility] [address]        Dear jaysonmaryanne,    Below is documentation for the wheelchair that you need    Patient has suffered a stroke  Patient has high risk for falls and injury due to difficulty with gait and balance    Patient is to use the wheelchair to aid in activities of daily living within the home and also outside the home to prevent falls.    Patient needs a light weight wheelchair as the patient has had a stroke and cannot self propel a regular wheelchair.      If you have any questions or concerns, please call the clinic at the number listed above.       Sincerely,        Huey Bullock MD

## 2020-10-15 NOTE — TELEPHONE ENCOUNTER
Left VM as wait time was over an hour to speak with a rep. Asked for a call back with further information as to what is needed from Dr. Bullock and also advised that provider is out of clinic till 10/23. Asked that a rep return call when able to discuss   Mohsen Ngo CMA on 10/15/2020 at 12:28 PM

## 2020-10-23 NOTE — TELEPHONE ENCOUNTER
Spoke to Dorota at [facility].  Relayed to her that letter was completed by Dr. Bullock.  Faxed letter to [phone] Attn: Dorota.

## 2020-10-23 NOTE — TELEPHONE ENCOUNTER
I redictated a note outlining the patient's needs to help her qualify for the wheelchair.    Huey Bullock MD on 10/23/2020 at 1:40 PM

## 2020-10-23 NOTE — TELEPHONE ENCOUNTER
"Dr. Srinivas PLASENCIA spoke to Dorota at [facility].  She states in order for insurance to pay for the wheelchair, they need the documentation to say, \"Wheelchair aides in daily living within the home.\" Current order states w/c will be used for outside the home.    Also, if you are wanting to order a lightweight w/c, documentation needs to say that the pt is unable to propel a standard w/c.  Family does think she would use the w/c within the home as well.    Are able to dictate another letter with these changes?    "

## 2020-11-06 ENCOUNTER — MEDICAL CORRESPONDENCE (OUTPATIENT)
Dept: HEALTH INFORMATION MANAGEMENT | Facility: CLINIC | Age: 85
End: 2020-11-06

## 2021-04-09 ENCOUNTER — VIRTUAL VISIT (OUTPATIENT)
Dept: NEUROLOGY | Facility: CLINIC | Age: 86
End: 2021-04-09
Payer: MEDICARE

## 2021-04-09 VITALS — HEIGHT: 63 IN | BODY MASS INDEX: 23.04 KG/M2 | WEIGHT: 130 LBS

## 2021-04-09 DIAGNOSIS — I63.9 ACUTE ISCHEMIC STROKE (H): Primary | ICD-10-CM

## 2021-04-09 DIAGNOSIS — F02.818 LATE ONSET ALZHEIMER'S DISEASE WITH BEHAVIORAL DISTURBANCE (H): ICD-10-CM

## 2021-04-09 DIAGNOSIS — G30.1 LATE ONSET ALZHEIMER'S DISEASE WITH BEHAVIORAL DISTURBANCE (H): ICD-10-CM

## 2021-04-09 PROCEDURE — 99214 OFFICE O/P EST MOD 30 MIN: CPT | Mod: 95 | Performed by: PSYCHIATRY & NEUROLOGY

## 2021-04-09 ASSESSMENT — MIFFLIN-ST. JEOR: SCORE: 1008.81

## 2021-04-09 NOTE — LETTER
"    4/9/2021         RE: Riddhi Liu  [address] [place] [address]        Dear Colleague,    Thank you for referring your patient, Riddhi Liu, to the [facility] [place]. Please see a copy of my visit note below.      Patient requested video follow-up note  Video consultation due to the COVID-19 pandemic  Phone #[phone]  Patient identified      .Patient is being evaluated via a billable video visit. The patient has been notified of following:     \"This video visit will be conducted via a call between you and your physician/provider. We have found that certain health care needs can be provided without the need for an in-person physical exam. This service lets us provide the care you need with a video conversation. If a prescription is necessary we can send it directly to your pharmacy. If lab work is needed we can place an order for that and you can then stop by our lab to have the test done at a later time.    If during the course of the call the physician/provider feels a video visit is not appropriate, you will not be charged for this service.    Physician has received verbal consent for a Video Visit from the patient? YES    Patient would like the video invitation sent by: Text [phone]    [employer]    Video Visit Details    Type of service: Video Visit    Video Start Time: 11:37 AM    Video End Time (time video stopped): 11:54 AM    Originating Location (pt. Location): Patient's Home    Distant Location (provider location): 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          [facility]  June 12, 2020 video visit  July 13, 2020 video visit  September 21, 2020 video visit  April 9, 2021 video visit      85-year-old with  Dementia Alzheimer's disease and vascular dementia  Past stroke CVA September 2019 with " "right gaze preference    Since last seen 6 months ago    No major new medical issues but has a lot of chronic issues with her heart and kidneys    Memory is slowly declining  Patient 1 at [facility] but could not remember but really enjoys going there  Patient would take a walk in the neighborhood with a wheelchair and see a house that was decorated and each time she saw to be surprised by how needed looked but not remember seeing it just the day before  Gait is off worse when she is tired when she is out and about she goes in the wheelchair  Has some bad dreams at night but they are less frequent now less frequent now as she is on the Seroquel        Patient lives with her 2 sons they help with caregiving  Power of  is taking care of  Living well is filled out with advanced directives  Discussed progression of dementia also today    No specific headache  But had a headache the other day at night but it was transient for less than 5 minutes  No chest pain no shortness of breath no abdominal pain no diarrhea no GERD  Says that she eats okay but son says that she says she is not hungry but then when they put the food down she will eventually eat it          Patient has a sister who developed maybe Alzheimer's disease in her 70s    Patient then had a \"stroke or event in summer 2019 and seemed to have further memory loss    Patient currently lives with Her 2 sons    Current situation  Lives with family  There are stairs there to level she does those okay  Eats okay  Actives of daily living does her own dressing and bathing  Other prescription and others do the checkbook  She denies any hallucinations  She has some insomnia and is on some Seroquel for that      No recent falls no injuries    No recent hospitalizations or major illnesses          Past medical history  Stroke September 2019  Coronary artery disease  Hypertension hyperlipidemia  Congestive heart failure  Diabetes  Kidney " disease  Cholecystectomy  Anemia  Irritable bowel syndrome        Habits  Non-smoker  Does not drink alcohol      Family history  Sister that may have had Alzheimer's developed in her 70s she is passed away  Mother with lung cancer  Brother with kidney disease  at age 78  Brother with cancer  Brother with heart disease  in his 50s          Work-up  2019, EEG no epileptiform activity  2019 MRI scan no obvious acute stroke or abnormal enhancement motion degraded  MRI scan brain 2020 mild atrophy  B12 727  TSH 1.70  EEG 8 Hz burst dominant rhythm with a little bit of theta disorganization 6-7 Hz              Exam    Review of past exam    No specific headache  But had a headache the other day at night but it was transient for less than 5 minutes  No chest pain no shortness of breath no abdominal pain no diarrhea no GERD  Says that she eats okay but son says that she says she is not hungry but then when they put the food down she will eventually eat it    No diplopia dysarthria dysphasia  No weakness  Does have arthritis in her right hand has some slow gait    Otherwise review of systems negative    General exam  Alert and attentive  HEENT normal  Breathing okay regular  No edema in the feet    Arthritic changes in the hands right greater than left  Decreased range of motion of the shoulders      Neurologic exam  Alert and attentive  Normal naming  Normal repetition  Normal prosody of speech  Follows a two-part command  No neglect    Short-term memory recall decreased  2021  Month is April  Last visit did not know the president this visit says it is Juneen   is a lady      Cranials 2 through 12 normal  No ophthalmoplegia  No visual field cut  Tongue twisters are good  No nystagmus  Face is    Upper extremities  No drift  No tremor  Finger-nose okay  Because of the shoulders and some arthritic changes in her hands    Lower extremities  Slight bradykinesia  No focal  weakness    Gait  Gets up from the rocking chair pushing off with her hands    Can ambulate independently but is somewhat cautious concern for balance on uneven surfaces or slippery surfaces  May have difficulty if she has to walk too far    Turns in the [place] without getting dizzy      Impression    1.  History of memory falloff going on since 2011 after her cardiac illness.  Seen after a event/stroke in September 2019  Family history of sister with dementia all type  Repeats herself a lot but long-term history is pretty good    2.  Vascular his include hypertension hyperlipidemia diabetes cardiac disease kidney disease    3.  Glucoses sometimes high but never low    On today's visit  We discussed progression of dementia  On today's visit discussed side effects and benefits of medicine including black box warning with Seroquel  Discussed end-of-life issues power of /living well  Discussed caregiver burnout    Talked about routine is helpful    Seems to have trouble with recall and short-term memory but no severe hallucinations or severe behavioral problems    Talked about how routine is helpful but also may be reminiscing about past family members and pictures that she can review as he gets her some confidence  Long discussion with patient's family about possibility of 2 different components both vascular and hereditary dementia  May have more of a vascular dementia but does have a sister with demented disease    Recommend    Aricept 5 mg once per day  Seroquel 25 mg at night    Vascular disease  Aspirin 325 mg once per day  Plavix 75 mg once per day  Crestor 20 g once per day  Filled by primary      Does have some irritable bowel but usually not a problem  Tolerating the Aricept at 5 mg once per day  Prefer not to go up higher    Discussed gait safety though and at risk of falls    Due to increased risk for falls recommend self propel lightweight collapsible wheelchair to prevent falls when patient is  taken to medical appointments the store or out of the house.    Follow-up in October 2021 in person for evaluation    30 minutes total care time today discussing end-of-life issues dementia and its progression medications side effects and benefits as above.          Again, thank you for allowing me to participate in the care of your patient.        Sincerely,        Huey Bullock MD

## 2021-04-09 NOTE — PROGRESS NOTES
"  Patient requested video follow-up note  Video consultation due to the COVID-19 pandemic  Phone #[phone]  Patient identified      .Patient is being evaluated via a billable video visit. The patient has been notified of following:     \"This video visit will be conducted via a call between you and your physician/provider. We have found that certain health care needs can be provided without the need for an in-person physical exam. This service lets us provide the care you need with a video conversation. If a prescription is necessary we can send it directly to your pharmacy. If lab work is needed we can place an order for that and you can then stop by our lab to have the test done at a later time.    If during the course of the call the physician/provider feels a video visit is not appropriate, you will not be charged for this service.    Physician has received verbal consent for a Video Visit from the patient? YES    Patient would like the video invitation sent by: Text [phone]    [employer]    Video Visit Details    Type of service: Video Visit    Video Start Time: 11:37 AM    Video End Time (time video stopped): 11:54 AM    Originating Location (pt. 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    Since last seen 6 months ago    No major new medical issues but has a lot of chronic issues with her heart and kidneys    Memory is slowly declining  Patient 1 at [facility] but could not remember but really enjoys going there  Patient would " "take a walk in the neighborhood with a wheelchair and see a house that was decorated and each time she saw to be surprised by how needed looked but not remember seeing it just the day before  Gait is off worse when she is tired when she is out and about she goes in the wheelchair  Has some bad dreams at night but they are less frequent now less frequent now as she is on the Seroquel        Patient lives with her 2 sons they help with caregiving  Power of  is taking care of  Living well is filled out with advanced directives  Discussed progression of dementia also today    No specific headache  But had a headache the other day at night but it was transient for less than 5 minutes  No chest pain no shortness of breath no abdominal pain no diarrhea no GERD  Says that she eats okay but son says that she says she is not hungry but then when they put the food down she will eventually eat it          Patient has a sister who developed maybe Alzheimer's disease in her 70s    Patient then had a \"stroke or event in summer 2019 and seemed to have further memory loss    Patient currently lives with Her 2 sons    Current situation  Lives with family  There are stairs there to level she does those okay  Eats okay  Actives of daily living does her own dressing and bathing  Other prescription and others do the checkbook  She denies any hallucinations  She has some insomnia and is on some Seroquel for that      No recent falls no injuries    No recent hospitalizations or major illnesses          Past medical history  Stroke 2019  Coronary artery disease  Hypertension hyperlipidemia  Congestive heart failure  Diabetes  Kidney disease  Cholecystectomy  Anemia  Irritable bowel syndrome        Habits  Non-smoker  Does not drink alcohol      Family history  Sister that may have had Alzheimer's developed in her 70s she is passed away  Mother with lung cancer  Brother with kidney disease  at age 78  Brother with " cancer  Brother with heart disease  in his 50s          Work-up  2019, EEG no epileptiform activity  2019 MRI scan no obvious acute stroke or abnormal enhancement motion degraded  MRI scan brain 2020 mild atrophy  B12 727  TSH 1.70  EEG 8 Hz burst dominant rhythm with a little bit of theta disorganization 6-7 Hz              Exam    Review of past exam    No specific headache  But had a headache the other day at night but it was transient for less than 5 minutes  No chest pain no shortness of breath no abdominal pain no diarrhea no GERD  Says that she eats okay but son says that she says she is not hungry but then when they put the food down she will eventually eat it    No diplopia dysarthria dysphasia  No weakness  Does have arthritis in her right hand has some slow gait    Otherwise review of systems negative    General exam  Alert and attentive  HEENT normal  Breathing okay regular  No edema in the feet    Arthritic changes in the hands right greater than left  Decreased range of motion of the shoulders      Neurologic exam  Alert and attentive  Normal naming  Normal repetition  Normal prosody of speech  Follows a two-part command  No neglect    Short-term memory recall decreased  2021  Month is April  Last visit did not know the president this visit says it is Eliza   is a lady      Cranials 2 through 12 normal  No ophthalmoplegia  No visual field cut  Tongue twisters are good  No nystagmus  Face is    Upper extremities  No drift  No tremor  Finger-nose okay  Because of the shoulders and some arthritic changes in her hands    Lower extremities  Slight bradykinesia  No focal weakness    Gait  Gets up from the rocking chair pushing off with her hands    Can ambulate independently but is somewhat cautious concern for balance on uneven surfaces or slippery surfaces  May have difficulty if she has to walk too far    Turns in the [place] without getting  dizzy      Impression    1.  History of memory falloff going on since 2011 after her cardiac illness.  Seen after a event/stroke in September 2019  Family history of sister with dementia all type  Repeats herself a lot but long-term history is pretty good    2.  Vascular his include hypertension hyperlipidemia diabetes cardiac disease kidney disease    3.  Glucoses sometimes high but never low    On today's visit  We discussed progression of dementia  On today's visit discussed side effects and benefits of medicine including black box warning with Seroquel  Discussed end-of-life issues power of /living well  Discussed caregiver burnout    Talked about routine is helpful    Seems to have trouble with recall and short-term memory but no severe hallucinations or severe behavioral problems    Talked about how routine is helpful but also may be reminiscing about past family members and pictures that she can review as he gets her some confidence  Long discussion with patient's family about possibility of 2 different components both vascular and hereditary dementia  May have more of a vascular dementia but does have a sister with demented disease    Recommend    Aricept 5 mg once per day  Seroquel 25 mg at night    Vascular disease  Aspirin 325 mg once per day  Plavix 75 mg once per day  Crestor 20 g once per day  Filled by primary      Does have some irritable bowel but usually not a problem  Tolerating the Aricept at 5 mg once per day  Prefer not to go up higher    Discussed gait safety though and at risk of falls    Due to increased risk for falls recommend self propel lightweight collapsible wheelchair to prevent falls when patient is taken to medical appointments the store or out of the house.    Follow-up in October 2021 in person for evaluation    30 minutes total care time today discussing end-of-life issues dementia and its progression medications side effects and benefits as above.

## 2021-06-08 DIAGNOSIS — R41.3 MEMORY LOSS: ICD-10-CM

## 2021-06-08 RX ORDER — DONEPEZIL HYDROCHLORIDE 5 MG/1
5 TABLET, FILM COATED ORAL AT BEDTIME
Qty: 30 TABLET | Refills: 4 | Status: SHIPPED | OUTPATIENT
Start: 2021-06-08 | End: 2021-09-07

## 2021-06-08 NOTE — TELEPHONE ENCOUNTER
Refill request for donepezil.  Last seen 4/9/21. Due for follow up 10/2021.  Medication T'd for review and signature    Marjorie Barnhart LPN on 6/8/2021 at 12:55 PM

## 2021-09-06 DIAGNOSIS — R41.3 MEMORY LOSS: ICD-10-CM

## 2021-09-07 RX ORDER — DONEPEZIL HYDROCHLORIDE 5 MG/1
TABLET, FILM COATED ORAL
Qty: 30 TABLET | Refills: 1 | Status: SHIPPED | OUTPATIENT
Start: 2021-09-07 | End: 2021-09-09

## 2021-09-07 NOTE — TELEPHONE ENCOUNTER
Refill request for donepezil.  Last seen on 4/9/21. Due for follow up appt in October 2021.  Medication T'd for review and signature    Marjorie Barnhart LPN on 9/7/2021 at 10:17 AM

## 2021-09-09 RX ORDER — DONEPEZIL HYDROCHLORIDE 5 MG/1
TABLET, FILM COATED ORAL
Qty: 90 TABLET | Refills: 0 | Status: SHIPPED | OUTPATIENT
Start: 2021-09-09

## 2021-09-09 NOTE — TELEPHONE ENCOUNTER
Refill needs to be for 90 days  Last seen 4/9/21.  Medication T'd for review and signature      Marjorie Barnhart LPN on 9/9/2021 at 12:16 PM      .